# Patient Record
Sex: MALE | URBAN - METROPOLITAN AREA
[De-identification: names, ages, dates, MRNs, and addresses within clinical notes are randomized per-mention and may not be internally consistent; named-entity substitution may affect disease eponyms.]

---

## 2021-07-08 ENCOUNTER — APPOINTMENT (RX ONLY)
Dept: URBAN - METROPOLITAN AREA CLINIC 69 | Facility: CLINIC | Age: 66
Setting detail: DERMATOLOGY
End: 2021-07-08

## 2021-07-08 DIAGNOSIS — L70.8 OTHER ACNE: ICD-10-CM | Status: RESOLVING

## 2021-07-08 DIAGNOSIS — L21.8 OTHER SEBORRHEIC DERMATITIS: ICD-10-CM

## 2021-07-08 DIAGNOSIS — L71.8 OTHER ROSACEA: ICD-10-CM

## 2021-07-08 PROBLEM — D23.61 OTHER BENIGN NEOPLASM OF SKIN OF RIGHT UPPER LIMB, INCLUDING SHOULDER: Status: ACTIVE | Noted: 2021-07-08

## 2021-07-08 PROCEDURE — ? DEFER

## 2021-07-08 PROCEDURE — ? PRESCRIPTION

## 2021-07-08 PROCEDURE — ? PRESCRIPTION MEDICATION MANAGEMENT

## 2021-07-08 PROCEDURE — ? DIAGNOSIS COMMENT

## 2021-07-08 PROCEDURE — 99204 OFFICE O/P NEW MOD 45 MIN: CPT

## 2021-07-08 RX ORDER — KETOCONAZOLE 20 MG/ML
SHAMPOO, SUSPENSION TOPICAL
Qty: 1 | Refills: 6 | Status: ERX | COMMUNITY
Start: 2021-07-08

## 2021-07-08 RX ADMIN — KETOCONAZOLE: 20 SHAMPOO, SUSPENSION TOPICAL at 00:00

## 2021-07-08 ASSESSMENT — LOCATION ZONE DERM
LOCATION ZONE: TRUNK
LOCATION ZONE: FACE

## 2021-07-08 ASSESSMENT — LOCATION DETAILED DESCRIPTION DERM
LOCATION DETAILED: LEFT SUPERIOR LATERAL BUCCAL CHEEK
LOCATION DETAILED: LEFT CENTRAL MALAR CHEEK
LOCATION DETAILED: SUPERIOR THORACIC SPINE

## 2021-07-08 ASSESSMENT — LOCATION SIMPLE DESCRIPTION DERM
LOCATION SIMPLE: LEFT CHEEK
LOCATION SIMPLE: UPPER BACK

## 2021-07-08 NOTE — PROCEDURE: PRESCRIPTION MEDICATION MANAGEMENT
Initiate Treatment: Wash face with panoxyl cleanser \\nAdd triple rosacea cream QD to face.
Detail Level: Zone
Render In Strict Bullet Format?: No
Plan: Initiate conservative tmt prior to upcoming infusion to see if mitigates flare, which generally likely indicates promising response to  chemo.
Initiate Treatment: Ketoconazole shampoo BIW to trunk, leave in 5 mins then rinse. \\nAdd panoxyl 10% cleanser QD to face, trunk. \\nAdd triple rosacea cream to face, upper trunk.
Initiate Treatment: Wash with Ketoconazole shampoo BIW, leave in 5 mins then rinse off.

## 2021-07-08 NOTE — PROCEDURE: MIPS QUALITY
Quality 130: Documentation Of Current Medications In The Medical Record: Current Medications Documented
Detail Level: Detailed
Quality 431: Preventive Care And Screening: Unhealthy Alcohol Use - Screening: Patient screened for unhealthy alcohol use using a single question and scores less than 2 times per year
Name And Contact Information For Health Care Proxy: Preeti Magdaleno
Quality 47: Advance Care Plan: Advance Care Planning discussed and documented; advance care plan or surrogate decision maker documented in the medical record.
Quality 110: Preventive Care And Screening: Influenza Immunization: Influenza Immunization previously received during influenza season
Quality 226: Preventive Care And Screening: Tobacco Use: Screening And Cessation Intervention: Patient screened for tobacco use and is an ex/non-smoker
Additional Notes: Pt has received COVID-19 vaccine.

## 2021-07-08 NOTE — PROCEDURE: DIAGNOSIS COMMENT
Render Risk Assessment In Note?: no
Comment: probable, but dermatoscopic appearance isn't 100% classic, plan punch exc/bx but after chemo, currently having q 2wks infusions and low wbc
Detail Level: Simple
Comment: chemo-related acneiform drug eruption

## 2021-07-08 NOTE — HPI: RASH
What Type Of Note Output Would You Prefer (Optional)?: Bullet Format
How Severe Is Your Rash?: moderate
Is This A New Presentation, Or A Follow-Up?: Rash
Additional History: Pt was diagnosed with Colon Ca 4 years ago and so was started on chemo (Irinotecan) pt was getting pustules on face and peeling on body, he started to break out with acne chest and back and some on face, 6 weeks ago he ended up in hospital since face had gotten super red and tender, broke out with tiny pustules. He was given creams which didn’t help so took steroid and it cleared it up, then 1 week later broke out on chest and back but not as painful he cleared up by itself, he then broke out in pubic area felt red and tenderness. He has been off chemoX 6 weeks. To start in 1.5 weeks due to going out of town, to be given a new chemo. 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 He hasn’t drank alcohol in 11 years. Decreased kidney function, infusion every 2 weeks. \\nWhite blood cell count has been low \\n\\nPt has had rosacea in past.